# Patient Record
Sex: FEMALE | Employment: OTHER | URBAN - METROPOLITAN AREA
[De-identification: names, ages, dates, MRNs, and addresses within clinical notes are randomized per-mention and may not be internally consistent; named-entity substitution may affect disease eponyms.]

---

## 2019-06-01 ENCOUNTER — THERAPY VISIT (OUTPATIENT)
Dept: PHYSICAL THERAPY | Facility: CLINIC | Age: 64
End: 2019-06-01
Payer: MEDICAID

## 2019-06-01 DIAGNOSIS — G89.29 CHRONIC PAIN OF RIGHT KNEE: Primary | ICD-10-CM

## 2019-06-01 DIAGNOSIS — M25.561 CHRONIC PAIN OF RIGHT KNEE: Primary | ICD-10-CM

## 2019-06-01 PROCEDURE — 97161 PT EVAL LOW COMPLEX 20 MIN: CPT | Mod: GP | Performed by: PHYSICAL THERAPIST

## 2019-06-01 PROCEDURE — 97110 THERAPEUTIC EXERCISES: CPT | Mod: GP | Performed by: PHYSICAL THERAPIST

## 2019-06-01 NOTE — PROGRESS NOTES
[facility] Initial Evaluation  Subjective:  The history is provided by the patient (patient's son). No  was used (Pt and son declined ).   Iris Madsen is a 63 year old female with a right knee condition.  Condition occurred with:  Insidious onset.  Condition occurred: for unknown reasons.  This is a chronic condition  6-1-2014 (5 years ago)  Last MD appt 5-29-19  .    Patient reports pain:  In the joint, anterior and medial.    Pain is described as sharp and aching and is constant and reported as 9/10.  Associated symptoms:  Buckling/giving out.   Symptoms are exacerbated by activity, bending/squatting, walking, standing, descending stairs, ascending stairs and transfers and relieved by nothing.  Since onset symptoms are gradually worsening.        General health as reported by patient is good.  Pertinent medical history includes:  None.  Medical allergies: no.  Other surgeries include:  None reported.  Current medications:  None as reported by the patient.  Current occupation is Not working  .        Barriers include:  None as reported by the patient.    Red flags:  None as reported by the patient.                        Objective:  Standing Alignment:              Knee deviations alignment: Marked genu valgus R.      Gait:  Generally guarded gait pattern with minimal motion at R hip, knee noted.    Gait Type:  Antalgic   Assistive Devices:  Cane  Deviations:  Hip:  Decr dynamic control R and hip hiking RKnee:  Knee flexion decr R    Flexibility/Screens:       Lower Extremity:      Decreased right lower extremity flexibility:  Hamstrings and Gastroc                                                      Knee Evaluation:  ROM:  Strength wnl knee: R hip ext 4/5, hip abd 4-/5.  AROM    Hyperextension:  Left:  7    Right: 0  Extension:  Left: 0    Right:  15  Flexion: Left: 135    Right: 110  PROM    Hyperextension: Left:   Right:  0  Extension: Left:   Right:   12  Flexion: Left:   Right:  115      Strength:     Extension:  Right: 4+/5   Pain:  Flexion:  Right: 4+/5   Pain:    Quad Set Left: Fair    Pain:   Quad Set Right: Poor    Pain:      Palpation:      Right knee tenderness present at:  Medial Joint Line; Lateral Joint Line; Patellar Medial; Patellar Lateral; Patellar Superior and Patellar Inferior            General     ROS    Assessment/Plan:    Patient is a 63 year old female with right side knee complaints.    Patient has the following significant findings with corresponding treatment plan.                Diagnosis 1:  R knee pain (OA)  Pain -  self management, education and home program  Decreased ROM/flexibility - manual therapy, therapeutic exercise and home program  Decreased strength - therapeutic exercise, therapeutic activities and home program  Decreased proprioception - neuro re-education, therapeutic activities and home program  Impaired gait - gait training and home program  Impaired muscle performance - neuro re-education and home program  Decreased function - therapeutic activities and home program    Therapy Evaluation Codes:   1) History comprised of:   Personal factors that impact the plan of care:      Language and Time since onset of symptoms.    Comorbidity factors that impact the plan of care are:      None.     Medications impacting care: None.  2) Examination of Body Systems comprised of:   Body structures and functions that impact the plan of care:      Knee.   Activity limitations that impact the plan of care are:      Bathing, Dressing, Stairs and Walking.  3) Clinical presentation characteristics are:   Stable/Uncomplicated.  4) Decision-Making    Low complexity using standardized patient assessment instrument and/or measureable assessment of functional outcome.  Cumulative Therapy Evaluation is: Low complexity.    Previous and current functional limitations:  (See Goal Flow Sheet for this information)    Short term and Long term goals:  (See Goal Flow Sheet for this information)     Communication ability:  Patient appears to be able to clearly communicate and understand verbal and written communication and follow directions correctly.  Treatment Explanation - The following has been discussed with the patient:   RX ordered/plan of care  Anticipated outcomes  Possible risks and side effects  This patient would benefit from PT intervention to resume normal activities.   Rehab potential is fair.    Frequency:  1 X week, once daily  Duration:  for 4 weeks tapering to 2 X a month over 8 weeks  Discharge Plan:  Achieve all LTG.  Independent in home treatment program.  Reach maximal therapeutic benefit.    Please refer to the daily flowsheet for treatment today, total treatment time and time spent performing 1:1 timed codes.

## 2019-06-01 NOTE — LETTER
[facility]  [address]  [address]  [place] [address]  [phone]    Maral 3, 2019    Re: Iris Madsen   :   1955  MRN:  [id number]   REFERRING PHYSICIAN:   Murali Tinajero    [facility]    Date of Initial Evaluation:  19  Visits:  Rxs Used: 1  Reason for Referral:  Chronic pain of right knee    EVALUATION SUMMARY    [facility] Initial Evaluation  Subjective:  The history is provided by the patient (patient's son). No  was used (Pt and son declined ).   Iris Madsen is a 63 year old female with a right knee condition.  Condition occurred with:  Insidious onset.  Condition occurred: for unknown reasons.  This is a chronic condition  2014 (5 years ago) Last MD appt 19 Patient reports pain:  In the joint, anterior and medial.    Pain is described as sharp and aching and is constant and reported as 9/10.  Associated symptoms:  Buckling/giving out.   Symptoms are exacerbated by activity, bending/squatting, walking, standing, descending stairs, ascending stairs and transfers and relieved by nothing.  Since onset symptoms are gradually worsening.        General health as reported by patient is good.  Pertinent medical history includes:  None.  Medical allergies: no.  Other surgeries include:  None reported.  Current medications:  None as reported by the patient.  Current occupation is Not working      Barriers include:  None as reported by the patient.  Red flags:  None as reported by the patient.  Objective:  Standing Alignment:    Knee deviations alignment: Marked genu valgus R.  Gait:  Generally guarded gait pattern with minimal motion at R hip, knee noted.  Gait Type:  Antalgic   Assistive Devices:  Cane  Deviations:  Hip:  Decr dynamic control R and hip hiking RKnee:  Knee flexion decr R  Flexibility/Screens:   Lower Extremity:  Decreased right lower extremity flexibility:   Hamstrings and Gastroc       Knee Evaluation:  ROM:  Strength wnl knee: R hip ext 4/5, hip abd 4-/5.  AROM    Hyperextension:  Left:  7    Right: 0  Extension:  Left: 0    Right:  15    [place]   :   1955    Flexion: Left: 135    Right: 110  PROM    Hyperextension: Left:   Right:  0  Extension: Left:   Right:  12  Flexion: Left:   Right:  115      Strength:   Extension:  Right: 4+/5   Pain:  Flexion:  Right: 4+/5   Pain:    Quad Set Left: Fair    Pain:   Quad Set Right: Poor    Pain:      Palpation:    Right knee tenderness present at:  Medial Joint Line; Lateral Joint Line; Patellar Medial; Patellar Lateral; Patellar Superior and Patellar Inferior      Assessment/Plan:    Patient is a 63 year old female with right side knee complaints.    Patient has the following significant findings with corresponding treatment plan.                Diagnosis 1:  R knee pain (OA)  Pain -  self management, education and home program  Decreased ROM/flexibility - manual therapy, therapeutic exercise and home program  Decreased strength - therapeutic exercise, therapeutic activities and home program  Decreased proprioception - neuro re-education, therapeutic activities and home program  Impaired gait - gait training and home program  Impaired muscle performance - neuro re-education and home program  Decreased function - therapeutic activities and home program      Previous and current functional limitations:  (See Goal Flow Sheet for this information)    Short term and Long term goals: (See Goal Flow Sheet for this information)     Communication ability:  Patient appears to be able to clearly communicate and understand verbal and written communication and follow directions correctly.  Treatment Explanation - The following has been discussed with the patient:   RX ordered/plan of care  Anticipated outcomes  Possible risks and side effects  This patient would benefit from PT intervention to resume normal activities.   Rehab  potential is fair.    Frequency:  1 X week, once daily  Duration:  for 4 weeks tapering to 2 X a month over 8 weeks  Discharge Plan:  Achieve all LTG.  Independent in home treatment program.  Reach maximal therapeutic benefit.    Iris Cale   :   1955            Thank you for your referral.    INQUIRIES  Therapist: Amrik Major PT  [facility]  [address]  [place] [address]  Phone: [phone]  Fax: [phone]

## 2019-06-01 NOTE — LETTER
DEPARTMENT OF HEALTH AND HUMAN SERVICES  CENTERS FOR MEDICARE & MEDICAID SERVICES    PLAN/UPDATED PLAN OF PROGRESS FOR OUTPATIENT REHABILITATION    PATIENTS NAME:  Iris Madsen   : 1955  PROVIDER NUMBER:    [id number]  HICN: [id number]  PROVIDER NAME: [facility]  MEDICAL RECORD NUMBER: [id number]   START OF CARE DATE:   2019   TYPE:  PT  PRIMARY/TREATMENT DIAGNOSIS: (Pertinent Medical Diagnosis)  Chronic pain of right knee  VISITS FROM START OF CARE:  Rxs Used: 1     [facility] Initial Evaluation  Subjective:  The history is provided by the patient (patient's son). No  was used (Pt and son declined ). Iris Madsen is a 63 year old female with a right knee condition.  Condition occurred with:  Insidious onset.  Condition occurred: for unknown reasons.  This is a chronic condition  2014 (5 years ago)  Last MD appt 19  Patient reports pain:  In the joint, anterior and medial.    Pain is described as sharp and aching and is constant and reported as 9/10.  Associated symptoms:  Buckling/giving out.  Symptoms are exacerbated by activity, bending/squatting, walking, standing, descending stairs, ascending stairs and transfers and relieved by nothing.  Since onset symptoms are gradually worsening.   General health as reported by patient is good.  Pertinent medical history includes:  None.  Medical allergies: no.  Other surgeries include:  None reported.  Current medications:  None as reported by the patient.  Current occupation is Not working  Barriers include:  None as reported by the patient.  Red flags:  None as reported by the patient.                Objective:  Standing Alignment:    Knee deviations alignment: Marked genu valgus R.  Gait:  Generally guarded gait pattern with minimal motion at R hip, knee noted.  Gait Type:  Antalgic   Assistive Devices:  Cane  Deviations:  Hip:  Decr dynamic control R and hip hiking RKnee:   Knee flexion decr R  Flexibility/Screens:   Lower Extremity:  Decreased right lower extremity flexibility:  Hamstrings and Gastroc     Knee Evaluation:  ROM:  Strength wnl knee: R hip ext 4/5, hip abd 4-/5.  AROM  Hyperextension:  Left:  7    Right: 0  Extension:  Left: 0    Right:  15  Flexion: Left: 135    Right: 110  PROM  Hyperextension: Left:   Right:  0  Extension: Left:   Right:  12  Flexion: Left:   Right:  115  PATIENTS NAME:  Iris Madsen   : 1955    Strength:   Extension:  Right: 4+/5   Pain:  Flexion:  Right: 4+/5   Pain:    Quad Set Left: Fair    Pain:   Quad Set Right: Poor    Pain:  Palpation:    Right knee tenderness present at:  Medial Joint Line; Lateral Joint Line; Patellar Medial; Patellar Lateral; Patellar Superior and Patellar Inferior    Assessment/Plan:    Patient is a 63 year old female with right side knee complaints.    Patient has the following significant findings with corresponding treatment plan.                Diagnosis 1:  R knee pain (OA)  Pain -  self management, education and home program  Decreased ROM/flexibility - manual therapy, therapeutic exercise and home program  Decreased strength - therapeutic exercise, therapeutic activities and home program  Decreased proprioception - neuro re-education, therapeutic activities and home program  Impaired gait - gait training and home program  Impaired muscle performance - neuro re-education and home program  Decreased function - therapeutic activities and home program    Therapy Evaluation Codes:   1) History comprised of:   Personal factors that impact the plan of care:      Language and Time since onset of symptoms.    Comorbidity factors that impact the plan of care are:      None.     Medications impacting care: None.  2) Examination of Body Systems comprised of:   Body structures and functions that impact the plan of care:      Knee.   Activity limitations that impact the plan of care are:      Bathing, Dressing, Stairs and  "Walking.  3) Clinical presentation characteristics are:   Stable/Uncomplicated.  4) Decision-Making    Low complexity using standardized patient assessment instrument and/or measureable assessment of functional outcome.  Cumulative Therapy Evaluation is: Low complexity.  Previous and current functional limitations:  (See Goal Flow Sheet for this information)    Short term and Long term goals: (See Goal Flow Sheet for this information)   Communication ability:  Patient appears to be able to clearly communicate and understand verbal and written communication and follow directions correctly.  Treatment Explanation - The following has been discussed with the patient:   RX ordered/plan of care  Anticipated outcomes  Possible risks and side effects  This patient would benefit from PT intervention to resume normal activities.   Rehab potential is fair.  Frequency:  1 X week, once daily  Duration:  for 4 weeks tapering to 2 X a month over 8 weeks  PATIENTS NAME:  Iris Madsen   : 1955    Discharge Plan:  Achieve all LTG.  Independent in home treatment program.  Reach maximal therapeutic benefit.  Please refer to the daily flowsheet for treatment today, total treatment time and time spent performing 1:1 timed codes.         Caregiver Signature/Credentials _____________________________ Date ________       Treating Provider: Amrik Major PT   I have reviewed and certified the need for these services and plan of treatment while under my care.        PHYSICIAN'S SIGNATURE:   _________________________________________  Date___________   Murali Tinajero PA-C  Certification period: 2019   to        Functional Level Progress Report: Please see attached \"Goal Flow sheet for Functional level.\"    ____X____ Continue Services or       ________ DC Services                Service dates: From 2019 to present                         "

## 2019-06-08 ENCOUNTER — THERAPY VISIT (OUTPATIENT)
Dept: PHYSICAL THERAPY | Facility: CLINIC | Age: 64
End: 2019-06-08
Payer: MEDICAID

## 2019-06-08 DIAGNOSIS — G89.29 CHRONIC PAIN OF RIGHT KNEE: ICD-10-CM

## 2019-06-08 DIAGNOSIS — M25.561 CHRONIC PAIN OF RIGHT KNEE: ICD-10-CM

## 2019-06-08 PROCEDURE — 97110 THERAPEUTIC EXERCISES: CPT | Mod: GP | Performed by: PHYSICAL THERAPIST

## 2019-06-08 PROCEDURE — 97112 NEUROMUSCULAR REEDUCATION: CPT | Mod: GP | Performed by: PHYSICAL THERAPIST

## 2019-06-19 ENCOUNTER — THERAPY VISIT (OUTPATIENT)
Dept: PHYSICAL THERAPY | Facility: CLINIC | Age: 64
End: 2019-06-19
Payer: MEDICAID

## 2019-06-19 DIAGNOSIS — G89.29 CHRONIC PAIN OF RIGHT KNEE: ICD-10-CM

## 2019-06-19 DIAGNOSIS — M25.561 CHRONIC PAIN OF RIGHT KNEE: ICD-10-CM

## 2019-06-19 PROCEDURE — T1013 SIGN LANG/ORAL INTERPRETER: HCPCS | Mod: U3 | Performed by: PHYSICAL THERAPY ASSISTANT

## 2019-06-19 PROCEDURE — 97110 THERAPEUTIC EXERCISES: CPT | Mod: GP | Performed by: PHYSICAL THERAPY ASSISTANT

## 2019-06-19 NOTE — LETTER
[place] [facility]  [address]  [address]  [place] [address]  [phone]    2019    Re: Iris Madsen   :   1955  MRN:  [id number]   REFERRING PHYSICIAN:   Murali Tinajero    [facility]    Date of Initial Evaluation:  19  Visits:  Rxs Used: 3 (PTA 1)  Reason for Referral:  Chronic pain of right knee    EVALUATION SUMMARY    PROGRESS  REPORT    Progress reporting period is from 2019 to 2019.       SUBJECTIVE  Subjective changes noted by patient: No major changes for the R knee. Son and pt are wondering could a partial KR be the next step versus the TKR.     Current pain level is  No change.     Previous pain level was 9/10.   Changes in function:  None  Adverse reaction to treatment or activity: None    OBJECTIVE  Changes noted in objective findings:  Supine: 9at rest) noted 30 degrees of valgus on the right knee/leg.   AROM: right knee AROM: 0- degrees. Left knee 0-0-140 degrees.      ASSESSMENT/PLAN  Updated problem list and treatment plan: Diagnosis 1:  Right knee pain, OA.   Pain -  manual therapy, splint/taping/bracing/orthotics, self management and home program  Decreased ROM/flexibility - manual therapy, therapeutic exercise and home program  Decreased strength - therapeutic exercise, therapeutic activities and home program  Impaired gait - gait training, assistive devices and home program  Decreased function - therapeutic activities and home program  STG/LTGs have been met or progress has been made towards goals:  None  Assessment of Progress: The patient's condition is unchanged.  Self Management Plans:  Patient has been instructed in a home treatment program.  Patient  has been instructed in self management of symptoms.  I have re-evaluated this patient and find that the nature, scope, duration and intensity of the therapy is appropriate for the medical condition of the patient.  Iris continues to  require the following intervention to meet STG and LTG's:  PT     Iris Madsen   :   1955        Recommendations:  This patient would benefit from further evaluation.  The progress note summary was written in collaboration with and reviewed by the physical therapist.              Thank you for your referral.    INQUIRIES  Therapist: Amrik Major, CRISTIN   [facility]  [address]  [place] [address]  Phone: [phone]  Fax: [phone]

## 2019-06-19 NOTE — PROGRESS NOTES
PROGRESS  REPORT    Progress reporting period is from 6/1/2019 to 6/19/2019.       SUBJECTIVE  Subjective changes noted by patient: No major changes for the R knee. Son and pt are wondering could a partial KR be the next step versus the total KR.     Current pain level is  No change.     Previous pain level was 9/10.   Changes in function:  None  Adverse reaction to treatment or activity: None    OBJECTIVE  Changes noted in objective findings:  Supine: (at rest) noted 30 degrees of valgus on the right knee/leg.   AROM: right knee AROM: 0- degrees. Left knee 0-0-140 degrees.      ASSESSMENT/PLAN  Updated problem list and treatment plan: Diagnosis 1:  Right knee pain, OA.   Pain -  manual therapy, splint/taping/bracing/orthotics, self management and home program  Decreased ROM/flexibility - manual therapy, therapeutic exercise and home program  Decreased strength - therapeutic exercise, therapeutic activities and home program  Impaired gait - gait training, assistive devices and home program  Decreased function - therapeutic activities and home program  STG/LTGs have been met or progress has been made towards goals:  None  Assessment of Progress: The patient's condition is unchanged.  Self Management Plans:  Patient has been instructed in a home treatment program.  Patient  has been instructed in self management of symptoms.  I have re-evaluated this patient and find that the nature, scope, duration and intensity of the therapy is appropriate for the medical condition of the patient.  [place] continues to require the following intervention to meet STG and LTG's:  PT    Recommendations:  This patient would benefit from further evaluation.  The progress note summary was written in collaboration with and reviewed by the physical therapist.    Please refer to the daily flowsheet for treatment today, total treatment time and time spent performing 1:1 timed codes.

## 2019-08-19 PROBLEM — M25.561 CHRONIC PAIN OF RIGHT KNEE: Status: RESOLVED | Noted: 2019-06-01 | Resolved: 2019-08-19

## 2019-08-19 PROBLEM — G89.29 CHRONIC PAIN OF RIGHT KNEE: Status: RESOLVED | Noted: 2019-06-01 | Resolved: 2019-08-19

## 2019-08-19 NOTE — PROGRESS NOTES
Pt last seen in PT 06/19.  See recommendations that date.  No further PT has been scheduled since then.  Consider note dated 06/19 to serve as final summary.

## 2019-10-03 ENCOUNTER — THERAPY VISIT (OUTPATIENT)
Dept: PHYSICAL THERAPY | Facility: CLINIC | Age: 64
End: 2019-10-03
Payer: COMMERCIAL

## 2019-10-03 DIAGNOSIS — M25.561 ACUTE PAIN OF RIGHT KNEE: Primary | ICD-10-CM

## 2019-10-03 DIAGNOSIS — Z47.1 AFTERCARE FOLLOWING KNEE JOINT REPLACEMENT SURGERY, UNSPECIFIED LATERALITY: ICD-10-CM

## 2019-10-03 DIAGNOSIS — Z96.659 AFTERCARE FOLLOWING KNEE JOINT REPLACEMENT SURGERY, UNSPECIFIED LATERALITY: ICD-10-CM

## 2019-10-03 PROCEDURE — 97110 THERAPEUTIC EXERCISES: CPT | Mod: GP | Performed by: PHYSICAL THERAPIST

## 2019-10-03 PROCEDURE — 97530 THERAPEUTIC ACTIVITIES: CPT | Mod: GP | Performed by: PHYSICAL THERAPIST

## 2019-10-03 PROCEDURE — 97161 PT EVAL LOW COMPLEX 20 MIN: CPT | Mod: GP | Performed by: PHYSICAL THERAPIST

## 2019-10-03 NOTE — LETTER
[facility] ATHLETIC [facility] PHYSICAL THERAPY  [address]  [facility] [address]  [phone]    2019    Re: Iris Madsen   :   1955  MRN:  [id number]   REFERRING PHYSICIAN:   Cassandra Aldridge    [facility] ATHLETIC [facility] PHYSICAL [facility]  Date of Initial Evaluation:  10/3/2019  Visits:    1  Reason for Referral:     Acute pain of right knee  Aftercare following knee joint replacement surgery, unspecified laterality    [facility] Athletic [facility] Initial Evaluation -- Lower Extremity  Evaluation Date: October 3, 2019  Iris Madsen is a 64 year old female with a Rt knee condition.   Referral: Ortho  Work mechanical stresses: NA   Employment status: Retired  Leisure mechanical stresses:   Functional disability score: Pt did not complete  VAS score (0-10): 5-6/10  Patient goals/expectations:  Walk without cane.    HISTORY:  Present symptoms: Global Rt knee pain  Pain quality (sharp/shooting/stabbing/aching/burning/cramping):  achy  Present since (onset date): 5 yr hx of advanced DJD.  TKA 19.    Symptoms (improving/unchaning/worsening):  Improving.    Symptoms commenced as a result of: No apparent reason   Condition occurred in the following environment: Unknown   Symptoms at onset: Rt knee    Paresthesia (yes/no):  No  Spinal history: No     Cough/Sneeze (pos/neg):  Neg  Constant symptoms:   Intermittent symptoms: As above  Symptoms are worse with the following: Always Bending, Always Rising, Always First few steps, Always Standing, Always Walking, Always Stairs, Always Squatting and Time of day - No effect   Symptoms are better with the following: Other - ice, pain meds  Continued use makes the pain (better/worse/no effect): NA  Disturbed night (yes/no): Yes    Pain at rest (yes/no):  Yes    ite (back/hip/knee/ankle/foot):  Rt knee  Other questions (swelling/clicking/locking/giving way/falling):  swelling   Previous episodes: No  Previous  treatments: PT pre surgery  Re: Iris Madsen   :   1955    Specific Questions:  General health (excellent/good/fair/poor):  Good  Pertinent medical history includes: None  Medications (nil/NSAIDS/analg/steroids/anticoag/other):  None  Medical allergies:  No  Imaging (none/Xray/MRI/other):  X-ray  Recent or major surgery (yes/no):  No  Night pain (yes/no):  No  Accidents (yes/no):  No  Unexplained weight loss (yes/no):  No  Barriers at home: Lives in apartment, 12-14 stairs to get to apartment with railing  Other red flags: No    Sites for physical examination (back/hip/knee/ankle/foot/other): Knee    EXAMINATION    Posture:  Sitting (good/fair/poor): NT    Correction of Posture (better/worse/no effect/NA): NT  Standing (good/fair/poor): NT  Other observations:  Gait - Lacks TKE and knee flex  Neurological: (NA/motor/sensory/reflexes/dural): NT  Baselines (pain or functional activity): Decreased overall functional mobility due to post surgical status  Extremities (Hip / Knee / Ankle / Foot): Knee  Movement Loss Ehsan Mod Min Nil Pain   Flexion  X   105 deg/heel slide   Extension X    15 deg lacking   Passive Movement (+/- over pressure)/(PDM/ERP):  NT  Resisted Test Response (pain): Quad set fair; SLR with min-mod lag  Other Tests: NT    Spine:  Movement loss: NT  Effect of repeated movements: NT  Effect of static positioning: NT  Spine testing (not relevant/relevant/secondary problem): Not relevant                        Re: Iris Madsen   :   1955    Baseline Symptoms: NA  Repeated Tests Symptom Response Mechanical Response   Active/Passive movement, resisted test, functional test During -  Produce, Abolish, Increase, Decrease, NE After -  Better, Worse, NB, NW, NE Effect -   ? or ? ROM, strength or key functional test No   Effect   NT       Effect of static positioning       NT         Provisional Classification (Extremity/Spine):  Extremity - Other - Post-Surgery      Princicple of Management:    Education:  HEP ed with continuing to perform home exercises by home PT 2 x daily.  Gait training with SPC    Equipment provided:  None  Exercise and dosage:  See flowsheet    ASSESSMENT/PLAN:  Patient is a 64 year old female with right side knee complaints.    Patient has the following significant findings with corresponding treatment plan.                Diagnosis 1:  Rt TKA  Pain -  self management, education, directional preference exercise and home program  Decreased ROM/flexibility - manual therapy, therapeutic exercise and home program  Decreased joint mobility - manual therapy, therapeutic exercise and home program  Decreased strength - therapeutic exercise, therapeutic activities and home program  Impaired balance - neuro re-education, therapeutic activities and home program  Decreased proprioception - neuro re-education, therapeutic activities and home program  Inflammation - cold therapy and self management/home program  Edema - cold therapy and self management/home program  Impaired gait - gait training and home program  Impaired muscle performance - neuro re-education and home program  Decreased function - therapeutic activities and home program  Previous and current functional limitations:  (See Goal Flow Sheet for this information)    Short term and Long term goals: (See Goal Flow Sheet for this information)   Communication ability:  Patient has an  for communication clarity.  Treatment Explanation - The following has been discussed with the patient:   RX ordered/plan of care, Anticipated outcomes, Possible risks and side effects              Re: Iris Cale   :   1955    This patient would benefit from PT intervention to resume normal activities.   Rehab potential is good.  Frequency:  2 X week, once daily  Duration:  for 4 weeks tapering to 1 x week x 4 weeks  Discharge Plan:  Achieve all LTG.  Independent in home treatment program.  Reach maximal therapeutic benefit.    Thank you  for your referral.    INQUIRIES        Therapist:  Anthony Ceballos, SCARLET, Cert. MDT  [facility]  [place] [facility]  [address] [facility] [address]  [facility] [address]  Phone: [phone]  Fax: [phone]

## 2019-10-03 NOTE — PROGRESS NOTES
[facility] Initial Evaluation -- Lower Extremity    Evaluation Date: October 3, 2019  Iris Madsen is a 64 year old female with a Rt knee condition.   Referral: Ortho  Work mechanical stresses: NA   Employment status: Retired  Leisure mechanical stresses:   Functional disability score: Pt did not complete  VAS score (0-10): 5-6/10  Patient goals/expectations:  Walk without cane.    HISTORY:    Present symptoms: Global Rt knee pain  Pain quality (sharp/shooting/stabbing/aching/burning/cramping):  achy    Present since (onset date): 5 yr hx of advanced DJD.  TKA 9/9/19.    Symptoms (improving/unchaning/worsening):  Improving.      Symptoms commenced as a result of: No apparent reason   Condition occurred in the following environment: Unknown     Symptoms at onset: Rt knee  Paresthesia (yes/no):  No  Spinal history: No   Cough/Sneeze (pos/neg):  Neg    Constant symptoms:   Intermittent symptoms: As above    Symptoms are worse with the following: Always Bending, Always Rising, Always First few steps, Always Standing, Always Walking, Always Stairs, Always Squatting and Time of day - No effect   Symptoms are better with the following: Other - ice, pain meds    Continued use makes the pain (better/worse/no effect): NA    Disturbed night (yes/no): Yes      Pain at rest (yes/no):  Yes  Site (back/hip/knee/ankle/foot):  Rt knee    Other questions (swelling/clicking/locking/giving way/falling):  swelling     Previous episodes: No  Previous treatments: PT pre surgery    Specific Questions:  General health (excellent/good/fair/poor):  Good  Pertinent medical history includes: None  Medications (nil/NSAIDS/analg/steroids/anticoag/other):  None  Medical allergies:  No  Imaging (none/Xray/MRI/other):  X-ray  Recent or major surgery (yes/no):  No  Night pain (yes/no):  No  Accidents (yes/no):  No  Unexplained weight loss (yes/no):  No  Barriers at home: Lives in apartment, 12-14 stairs to get to apartment with  railing  Other red flags: No    Sites for physical examination (back/hip/knee/ankle/foot/other): Knee    EXAMINATION    Posture:  Sitting (good/fair/poor): NT    Correction of Posture (better/worse/no effect/NA): NT  Standing (good/fair/poor): NT  Other observations:  Gait - Lacks TKE and knee flex    Neurological: (NA/motor/sensory/reflexes/dural): NT    Baselines (pain or functional activity): Decreased overall functional mobility due to post surgical status    Extremities (Hip / Knee / Ankle / Foot): Knee    Movement Loss Ehsan Mod Min Nil Pain   Flexion  X   105 deg/heel slide   Extension X    15 deg lacking     Passive Movement (+/- over pressure)/(PDM/ERP):  NT  Resisted Test Response (pain): Quad set fair; SLR with min-mod lag  Other Tests: NT    Spine:  Movement loss: NT  Effect of repeated movements: NT  Effect of static positioning: NT  Spine testing (not relevant/relevant/secondary problem): Not relevant    Baseline Symptoms: NA  Repeated Tests Symptom Response Mechanical Response   Active/Passive movement, resisted test, functional test During -  Produce, Abolish, Increase, Decrease, NE After -  Better, Worse, NB, NW, NE Effect -   ? or ? ROM, strength or key functional test No   Effect   NT       Effect of static positioning       NT         Provisional Classification (Extremity/Spine):  Extremity - Other - Post-Surgery      Princicple of Management:   Education:  HEP ed with continuing to perform home exercises by home PT 2 x daily.  Gait training with SPC    Equipment provided:  None  Exercise and dosage:  See flowsheet    ASSESSMENT/PLAN:    Patient is a 64 year old female with right side knee complaints.    Patient has the following significant findings with corresponding treatment plan.                Diagnosis 1:  Rt TKA    Pain -  self management, education, directional preference exercise and home program  Decreased ROM/flexibility - manual therapy, therapeutic exercise and home program  Decreased  joint mobility - manual therapy, therapeutic exercise and home program  Decreased strength - therapeutic exercise, therapeutic activities and home program  Impaired balance - neuro re-education, therapeutic activities and home program  Decreased proprioception - neuro re-education, therapeutic activities and home program  Inflammation - cold therapy and self management/home program  Edema - cold therapy and self management/home program  Impaired gait - gait training and home program  Impaired muscle performance - neuro re-education and home program  Decreased function - therapeutic activities and home program    Previous and current functional limitations:  (See Goal Flow Sheet for this information)    Short term and Long term goals: (See Goal Flow Sheet for this information)     Communication ability:  Patient has an  for communication clarity.  Treatment Explanation - The following has been discussed with the patient:   RX ordered/plan of care  Anticipated outcomes  Possible risks and side effects  This patient would benefit from PT intervention to resume normal activities.   Rehab potential is good.    Frequency:  2 X week, once daily  Duration:  for 4 weeks tapering to 1 x week x 4 weeks  Discharge Plan:  Achieve all LTG.  Independent in home treatment program.  Reach maximal therapeutic benefit.    Please refer to the daily flowsheet for treatment today, total treatment time and time spent performing 1:1 timed codes.

## 2019-10-08 ENCOUNTER — THERAPY VISIT (OUTPATIENT)
Dept: PHYSICAL THERAPY | Facility: CLINIC | Age: 64
End: 2019-10-08
Payer: COMMERCIAL

## 2019-10-08 DIAGNOSIS — Z47.1 AFTERCARE FOLLOWING KNEE JOINT REPLACEMENT SURGERY, UNSPECIFIED LATERALITY: ICD-10-CM

## 2019-10-08 DIAGNOSIS — Z96.659 AFTERCARE FOLLOWING KNEE JOINT REPLACEMENT SURGERY, UNSPECIFIED LATERALITY: ICD-10-CM

## 2019-10-08 DIAGNOSIS — M25.561 ACUTE PAIN OF RIGHT KNEE: Primary | ICD-10-CM

## 2019-10-08 PROCEDURE — 97110 THERAPEUTIC EXERCISES: CPT | Mod: GP | Performed by: PHYSICAL THERAPIST

## 2019-10-08 PROCEDURE — 97140 MANUAL THERAPY 1/> REGIONS: CPT | Mod: GP | Performed by: PHYSICAL THERAPIST

## 2019-10-10 ENCOUNTER — THERAPY VISIT (OUTPATIENT)
Dept: PHYSICAL THERAPY | Facility: CLINIC | Age: 64
End: 2019-10-10
Payer: COMMERCIAL

## 2019-10-10 DIAGNOSIS — M25.561 ACUTE PAIN OF RIGHT KNEE: Primary | ICD-10-CM

## 2019-10-10 DIAGNOSIS — Z47.1 AFTERCARE FOLLOWING KNEE JOINT REPLACEMENT SURGERY, UNSPECIFIED LATERALITY: ICD-10-CM

## 2019-10-10 DIAGNOSIS — Z96.659 AFTERCARE FOLLOWING KNEE JOINT REPLACEMENT SURGERY, UNSPECIFIED LATERALITY: ICD-10-CM

## 2019-10-10 PROCEDURE — 97110 THERAPEUTIC EXERCISES: CPT | Mod: GP | Performed by: PHYSICAL THERAPIST

## 2019-10-10 PROCEDURE — 97530 THERAPEUTIC ACTIVITIES: CPT | Mod: GP | Performed by: PHYSICAL THERAPIST

## 2019-10-14 ENCOUNTER — THERAPY VISIT (OUTPATIENT)
Dept: PHYSICAL THERAPY | Facility: CLINIC | Age: 64
End: 2019-10-14
Payer: COMMERCIAL

## 2019-10-14 DIAGNOSIS — M25.561 ACUTE PAIN OF RIGHT KNEE: ICD-10-CM

## 2019-10-14 DIAGNOSIS — Z96.659 AFTERCARE FOLLOWING KNEE JOINT REPLACEMENT SURGERY, UNSPECIFIED LATERALITY: ICD-10-CM

## 2019-10-14 DIAGNOSIS — Z47.1 AFTERCARE FOLLOWING KNEE JOINT REPLACEMENT SURGERY, UNSPECIFIED LATERALITY: ICD-10-CM

## 2019-10-14 PROCEDURE — 97530 THERAPEUTIC ACTIVITIES: CPT | Mod: GP | Performed by: PHYSICAL THERAPY ASSISTANT

## 2019-10-14 PROCEDURE — 97110 THERAPEUTIC EXERCISES: CPT | Mod: GP | Performed by: PHYSICAL THERAPY ASSISTANT

## 2019-10-16 ENCOUNTER — THERAPY VISIT (OUTPATIENT)
Dept: PHYSICAL THERAPY | Facility: CLINIC | Age: 64
End: 2019-10-16
Payer: COMMERCIAL

## 2019-10-16 DIAGNOSIS — Z47.1 AFTERCARE FOLLOWING KNEE JOINT REPLACEMENT SURGERY, UNSPECIFIED LATERALITY: ICD-10-CM

## 2019-10-16 DIAGNOSIS — M25.561 ACUTE PAIN OF RIGHT KNEE: ICD-10-CM

## 2019-10-16 DIAGNOSIS — Z96.659 AFTERCARE FOLLOWING KNEE JOINT REPLACEMENT SURGERY, UNSPECIFIED LATERALITY: ICD-10-CM

## 2019-10-16 PROCEDURE — 97110 THERAPEUTIC EXERCISES: CPT | Mod: GP | Performed by: PHYSICAL THERAPY ASSISTANT

## 2019-10-16 PROCEDURE — 97530 THERAPEUTIC ACTIVITIES: CPT | Mod: GP | Performed by: PHYSICAL THERAPY ASSISTANT

## 2019-10-21 ENCOUNTER — THERAPY VISIT (OUTPATIENT)
Dept: PHYSICAL THERAPY | Facility: CLINIC | Age: 64
End: 2019-10-21
Payer: COMMERCIAL

## 2019-10-21 DIAGNOSIS — Z96.659 AFTERCARE FOLLOWING KNEE JOINT REPLACEMENT SURGERY, UNSPECIFIED LATERALITY: ICD-10-CM

## 2019-10-21 DIAGNOSIS — M25.561 ACUTE PAIN OF RIGHT KNEE: ICD-10-CM

## 2019-10-21 DIAGNOSIS — Z47.1 AFTERCARE FOLLOWING KNEE JOINT REPLACEMENT SURGERY, UNSPECIFIED LATERALITY: ICD-10-CM

## 2019-10-21 PROCEDURE — 97110 THERAPEUTIC EXERCISES: CPT | Mod: GP | Performed by: PHYSICAL THERAPY ASSISTANT

## 2019-10-21 PROCEDURE — 97530 THERAPEUTIC ACTIVITIES: CPT | Mod: GP | Performed by: PHYSICAL THERAPY ASSISTANT

## 2019-10-21 NOTE — LETTER
"[facility] ATHLETIC [facility] PHYSICAL THERAPY  [address]  [facility] [address]  [phone]    2019    Re: Iris Madsen   :   1955  MRN:  [id number]   REFERRING PHYSICIAN:   Cassandra Aldridge    [facility] ATHLETIC [facility] PHYSICAL THERAPY    Date of Initial Evaluation:  10/3/2019  Visits:   6  Reason for Referral:     Acute pain of right knee  Aftercare following knee joint replacement surgery, unspecified laterality    PROGRESS  REPORT:  Progress reporting period is from 10/3/2019 to 10/1/2019..       SUBJECTIVE:   Pt reports knee mainly stiff vs painful.  Has started walking around a mall being able to go 15 minutes before needing to rest d/t fatigue not soreness. Is able to go up stairs normally but not down yet as does not trust her leg. Is sleeping almost normally with mild aching sometimes awakening her.    Current Pain level: 3/10.   Initial Pain level: 5/10.   Changes in function:  Yes (See Goal flowsheet attached for changes in current functional level).  Adverse reaction to treatment or activity: None    OBJECTIVE  Changes noted in objective findings:  AAROM 0-2-118. Lacks TKE and push off with ambulation but corrects with cueing. Minor swelling present at superior/lateral knee and appropriate for post op status. Pt is doing well with HEP. Is able to do 4\" step up and over with minimal difficulty.      ASSESSMENT/PLAN  Updated problem list and treatment plan: Diagnosis 1:  R TKR   STG/LTGs have been met or progress has been made towards goals:  Yes (See Goal flow sheet completed today.)  Assessment of Progress: The patient's condition is improving.  Patient is meeting short term goals and is progressing towards long term goals.  Self Management Plans:  Patient has been instructed in a home treatment program.  Patient is independent in a home treatment program.  Patient  has been instructed in self management of symptoms.  I have " re-evaluated this patient and find that the nature, scope, duration and intensity of the therapy is appropriate for the medical condition of the patient.  Iris continues to require the following intervention to meet STG and LTG's:  PT              Re: Iris Madsen   :   1955    Recommendations:  This patient would benefit from continued therapy.     Frequency:  2 X week, once daily  Duration:  for 2 weeks tapering to 1 X a week over 3-4 weeks    Thank you for your referral.    INQUIRIES      Therapist:  Areli Lizarraga, [facility]  [facility] PHYSICAL THERAPY  [address] [facility] [address]  [facility] [address]  Phone: [phone]  Fax: [phone]

## 2019-10-23 NOTE — PROGRESS NOTES
"Subjective:  HPI                    Objective:  System    Physical Exam    General     ROS    Assessment/Plan:    PROGRESS  REPORT    Progress reporting period is from 10/3/2019 to 10/1/2019..       SUBJECTIVE:   Pt reports knee mainly stiff vs painful.  Has started walking around a mall being able to go 15 minutes before needing to rest d/t fatigue not soreness. Is able to go up stairs normally but not down yet as does not trust her leg. Is sleeping almost normally with mild aching sometimes awakening her.    Current Pain level: 3/10.      Initial Pain level: 5/10.   Changes in function:  Yes (See Goal flowsheet attached for changes in current functional level)  Adverse reaction to treatment or activity: None    OBJECTIVE  Changes noted in objective findings:  AAROM 0-2-118. Lacks TKE and push off with ambulation but corrects with cueing. Minor swelling present at superior/lateral knee and appropriate for post op status. Pt is doing well with HEP. Is able to do 4\" step up and over with minimal difficulty.        ASSESSMENT/PLAN  Updated problem list and treatment plan: Diagnosis 1:  R TKR   STG/LTGs have been met or progress has been made towards goals:  Yes (See Goal flow sheet completed today.)  Assessment of Progress: The patient's condition is improving.  Patient is meeting short term goals and is progressing towards long term goals.  Self Management Plans:  Patient has been instructed in a home treatment program.  Patient is independent in a home treatment program.  Patient  has been instructed in self management of symptoms.  I have re-evaluated this patient and find that the nature, scope, duration and intensity of the therapy is appropriate for the medical condition of the patient.  [place] continues to require the following intervention to meet STG and LTG's:  PT    Recommendations:  This patient would benefit from continued therapy.     Frequency:  2 X week, once daily  Duration:  for 2 weeks tapering to 1 X " a week over 3-4 weeks        Please refer to the daily flowsheet for treatment today, total treatment time and time spent performing 1:1 timed codes.

## 2019-10-24 ENCOUNTER — THERAPY VISIT (OUTPATIENT)
Dept: PHYSICAL THERAPY | Facility: CLINIC | Age: 64
End: 2019-10-24
Payer: COMMERCIAL

## 2019-10-24 DIAGNOSIS — Z96.659 AFTERCARE FOLLOWING KNEE JOINT REPLACEMENT SURGERY, UNSPECIFIED LATERALITY: ICD-10-CM

## 2019-10-24 DIAGNOSIS — Z47.1 AFTERCARE FOLLOWING KNEE JOINT REPLACEMENT SURGERY, UNSPECIFIED LATERALITY: ICD-10-CM

## 2019-10-24 DIAGNOSIS — M25.561 ACUTE PAIN OF RIGHT KNEE: Primary | ICD-10-CM

## 2019-10-24 PROCEDURE — 97530 THERAPEUTIC ACTIVITIES: CPT | Mod: GP | Performed by: PHYSICAL THERAPIST

## 2019-10-24 PROCEDURE — 97110 THERAPEUTIC EXERCISES: CPT | Mod: GP | Performed by: PHYSICAL THERAPIST

## 2019-10-28 ENCOUNTER — THERAPY VISIT (OUTPATIENT)
Dept: PHYSICAL THERAPY | Facility: CLINIC | Age: 64
End: 2019-10-28
Payer: COMMERCIAL

## 2019-10-28 DIAGNOSIS — M25.561 ACUTE PAIN OF RIGHT KNEE: ICD-10-CM

## 2019-10-28 DIAGNOSIS — Z96.659 AFTERCARE FOLLOWING KNEE JOINT REPLACEMENT SURGERY, UNSPECIFIED LATERALITY: ICD-10-CM

## 2019-10-28 DIAGNOSIS — Z47.1 AFTERCARE FOLLOWING KNEE JOINT REPLACEMENT SURGERY, UNSPECIFIED LATERALITY: ICD-10-CM

## 2019-10-28 PROCEDURE — 97110 THERAPEUTIC EXERCISES: CPT | Mod: GP | Performed by: PHYSICAL THERAPY ASSISTANT

## 2019-10-28 PROCEDURE — 97530 THERAPEUTIC ACTIVITIES: CPT | Mod: GP | Performed by: PHYSICAL THERAPY ASSISTANT

## 2019-10-31 ENCOUNTER — THERAPY VISIT (OUTPATIENT)
Dept: PHYSICAL THERAPY | Facility: CLINIC | Age: 64
End: 2019-10-31
Payer: COMMERCIAL

## 2019-10-31 DIAGNOSIS — Z96.659 AFTERCARE FOLLOWING KNEE JOINT REPLACEMENT SURGERY, UNSPECIFIED LATERALITY: ICD-10-CM

## 2019-10-31 DIAGNOSIS — M25.561 ACUTE PAIN OF RIGHT KNEE: Primary | ICD-10-CM

## 2019-10-31 DIAGNOSIS — Z47.1 AFTERCARE FOLLOWING KNEE JOINT REPLACEMENT SURGERY, UNSPECIFIED LATERALITY: ICD-10-CM

## 2019-10-31 PROCEDURE — 97110 THERAPEUTIC EXERCISES: CPT | Mod: GP | Performed by: PHYSICAL THERAPIST

## 2019-10-31 PROCEDURE — 97530 THERAPEUTIC ACTIVITIES: CPT | Mod: GP | Performed by: PHYSICAL THERAPIST

## 2019-11-06 ENCOUNTER — THERAPY VISIT (OUTPATIENT)
Dept: PHYSICAL THERAPY | Facility: CLINIC | Age: 64
End: 2019-11-06
Payer: COMMERCIAL

## 2019-11-06 DIAGNOSIS — M25.561 ACUTE PAIN OF RIGHT KNEE: ICD-10-CM

## 2019-11-06 DIAGNOSIS — Z47.1 AFTERCARE FOLLOWING KNEE JOINT REPLACEMENT SURGERY, UNSPECIFIED LATERALITY: ICD-10-CM

## 2019-11-06 DIAGNOSIS — Z96.659 AFTERCARE FOLLOWING KNEE JOINT REPLACEMENT SURGERY, UNSPECIFIED LATERALITY: ICD-10-CM

## 2019-11-06 PROCEDURE — 97530 THERAPEUTIC ACTIVITIES: CPT | Mod: GP | Performed by: PHYSICAL THERAPY ASSISTANT

## 2019-11-06 PROCEDURE — 97110 THERAPEUTIC EXERCISES: CPT | Mod: GP | Performed by: PHYSICAL THERAPY ASSISTANT

## 2019-11-11 ENCOUNTER — THERAPY VISIT (OUTPATIENT)
Dept: PHYSICAL THERAPY | Facility: CLINIC | Age: 64
End: 2019-11-11
Payer: COMMERCIAL

## 2019-11-11 DIAGNOSIS — Z96.659 AFTERCARE FOLLOWING KNEE JOINT REPLACEMENT SURGERY, UNSPECIFIED LATERALITY: ICD-10-CM

## 2019-11-11 DIAGNOSIS — M25.561 ACUTE PAIN OF RIGHT KNEE: ICD-10-CM

## 2019-11-11 DIAGNOSIS — Z47.1 AFTERCARE FOLLOWING KNEE JOINT REPLACEMENT SURGERY, UNSPECIFIED LATERALITY: ICD-10-CM

## 2019-11-11 PROCEDURE — 97530 THERAPEUTIC ACTIVITIES: CPT | Mod: GP | Performed by: PHYSICAL THERAPY ASSISTANT

## 2019-11-11 PROCEDURE — 97110 THERAPEUTIC EXERCISES: CPT | Mod: GP | Performed by: PHYSICAL THERAPY ASSISTANT

## 2019-11-18 ENCOUNTER — THERAPY VISIT (OUTPATIENT)
Dept: PHYSICAL THERAPY | Facility: CLINIC | Age: 64
End: 2019-11-18
Payer: COMMERCIAL

## 2019-11-18 DIAGNOSIS — M25.561 ACUTE PAIN OF RIGHT KNEE: ICD-10-CM

## 2019-11-18 DIAGNOSIS — Z96.659 AFTERCARE FOLLOWING KNEE JOINT REPLACEMENT SURGERY, UNSPECIFIED LATERALITY: ICD-10-CM

## 2019-11-18 DIAGNOSIS — Z47.1 AFTERCARE FOLLOWING KNEE JOINT REPLACEMENT SURGERY, UNSPECIFIED LATERALITY: ICD-10-CM

## 2019-11-18 PROCEDURE — 97110 THERAPEUTIC EXERCISES: CPT | Mod: GP | Performed by: PHYSICAL THERAPY ASSISTANT

## 2019-11-18 PROCEDURE — 97530 THERAPEUTIC ACTIVITIES: CPT | Mod: GP | Performed by: PHYSICAL THERAPY ASSISTANT

## 2019-11-18 NOTE — LETTER
[facility] ATHLETIC [facility] PHYSICAL [id number]   [place] [address]  [phone]    2019    Re: Iris Madsen   :   1955  MRN:  [id number]   REFERRING PHYSICIAN:   Cassandra Aldridge    [facility] ATHLETIC [facility] PHYSICAL THERAPY    Date of Initial Evaluation:  10/3/2019  Visits:    12  Reason for Referral:     Acute pain of right knee  Aftercare following knee joint replacement surgery, unspecified laterality    PROGRESS  REPORT:  Progress reporting period is from 10/3/2019 to 2019. .     SUBJECTIVE  Pt reports no pain in knee and is mainly slightly stiff. Is walking around home without her cane and using while in public  as needed. Leg feels stronger . Plans on returning home in about a month.     Current pain level is 0/10  .     Changes in function:  Yes (See Goal flowsheet attached for changes in current functional level)  Adverse reaction to treatment or activity: None    OBJECTIVE  Objective: Knee ROM 0-0-135. Gait NNL with intermittent cueing needed for push off . Able to negotiate stairs with minimal pelvic compensation now and with good eccentric control. Is doing well with her HEP and has been compliant. SLS time approx 8-10 seconds max before touching down.      ASSESSMENT/PLAN  Updated problem list and treatment plan: Diagnosis 1:  R TKR   STG/LTGs have been met or progress has been made towards goals:  Yes (See Goal flow sheet completed today.)  Assessment of Progress: Patient is meeting short term goals and is progressing towards long term goals.  Self Management Plans:  Patient has been instructed in a home treatment program.  Patient is independent in a home treatment program.  Patient  has been instructed in self management of symptoms.  Patient is independent in self management of symptoms.  I have re-evaluated this patient and find that the nature, scope, duration and intensity of the therapy is appropriate for the  medical condition of the patient.  Iris continues to require the following intervention to meet STG and LTG's:  PT          Re: Iris Madsen   :   1955    Recommendations:  This patient would benefit from continued therapy.     Frequency:  1 X week, once daily  Duration:  For 2 weeks tapering to 1x every 2 weeks for 2-3 weeks     Thank you for your referral.    INQUIRIES    Therapist:  Areli Lizarraga [facility]  [facility]IC MEDIC[facility] PHYSICAL THERAPY  [address] [place] [address] [address]  [facility] [address]  Phone: [phone]  Fax: [phone]

## 2019-11-18 NOTE — PROGRESS NOTES
Subjective:  HPI                    Objective:  System    Physical Exam    General     ROS    Assessment/Plan:    PROGRESS  REPORT    Progress reporting period is from 10/3/2019 to 11/18/2019. .       SUBJECTIVE    Pt reports no pain in knee and is mainly slightly stiff. Is walking around home without her cane and using while in public  as needed. Leg feels stronger . Plans on returning home in about a month.     Current pain level is 0/10  .         Changes in function:  Yes (See Goal flowsheet attached for changes in current functional level)  Adverse reaction to treatment or activity: None    OBJECTIVE    Objective: Knee ROM 0-0-135. Gait NNL with intermittent cueing needed for push off . Able to negotiate stairs with minimal pelvic compensation now and with good eccentric control. Is doing well with her HEP and has been compliant. SLS time approx 8-10 seconds max before touching down.      ASSESSMENT/PLAN  Updated problem list and treatment plan: Diagnosis 1:  R TKR   STG/LTGs have been met or progress has been made towards goals:  Yes (See Goal flow sheet completed today.)  Assessment of Progress: Patient is meeting short term goals and is progressing towards long term goals.  Self Management Plans:  Patient has been instructed in a home treatment program.  Patient is independent in a home treatment program.  Patient  has been instructed in self management of symptoms.  Patient is independent in self management of symptoms.  I have re-evaluated this patient and find that the nature, scope, duration and intensity of the therapy is appropriate for the medical condition of the patient.  [place] continues to require the following intervention to meet STG and LTG's:  PT    Recommendations:  This patient would benefit from continued therapy.     Frequency:  1 X week, once daily  Duration:  For 2 weeks tapering to 1x every 2 weeks for 2-3 weeks         Please refer to the daily flowsheet for treatment today, total  treatment time and time spent performing 1:1 timed codes.

## 2019-11-21 ENCOUNTER — THERAPY VISIT (OUTPATIENT)
Dept: PHYSICAL THERAPY | Facility: CLINIC | Age: 64
End: 2019-11-21
Payer: COMMERCIAL

## 2019-11-21 DIAGNOSIS — M25.561 ACUTE PAIN OF RIGHT KNEE: Primary | ICD-10-CM

## 2019-11-21 DIAGNOSIS — Z96.659 AFTERCARE FOLLOWING KNEE JOINT REPLACEMENT SURGERY, UNSPECIFIED LATERALITY: ICD-10-CM

## 2019-11-21 DIAGNOSIS — Z47.1 AFTERCARE FOLLOWING KNEE JOINT REPLACEMENT SURGERY, UNSPECIFIED LATERALITY: ICD-10-CM

## 2019-11-21 PROCEDURE — 97530 THERAPEUTIC ACTIVITIES: CPT | Mod: GP | Performed by: PHYSICAL THERAPIST

## 2019-11-21 PROCEDURE — 97110 THERAPEUTIC EXERCISES: CPT | Mod: GP | Performed by: PHYSICAL THERAPIST

## 2019-11-21 NOTE — LETTER
[facility] ATHLETIC [facility] PHYSICAL THER  [address]   [place] [address]  [phone]    2019    Re: Iris Madsen   :   1955  MRN:  [id number]   REFERRING PHYSICIAN:   Cassandra Aldridge    [facility] ATHLETIC [facility] [place] PHYSICAL THER    Date of Initial Evaluation:  10/03/2019  Visits:  Rxs Used: 13  Reason for Referral:     Acute pain of right knee  Aftercare following knee joint replacement surgery, unspecified laterality    EVALUATION SUMMARY    DISCHARGE REPORT  Progress reporting period is from 10.3.19 to 19.       SUBJECTIVE  Subjective changes noted by patient:  Pt saw MD yesterday and was pleased with progress.  Pt denies any pain in knee.  Is walking around house without cane and is able to go up/down stairs in reciprocal fashion with railing.  Has continued to be consistent with HEP.  Will go back home to [facility] after [place].       Changes in function:  Yes (See Goal flowsheet attached for changes in current functional level)  Adverse reaction to treatment or activity: None  OBJECTIVE  Objective: ROM:  Rt knee 0-135 deg.  Strength:  Quad 5/5; H-S 5/5.  ASSESSMENT/PLAN  Updated problem list and treatment plan:   Diagnosis 1:  Rt TKA    Decreased ROM/flexibility - therapeutic exercise and home program  Decreased joint mobility - therapeutic exercise and home program  Decreased strength - therapeutic exercise and home program  Impaired muscle performance - home program  STG/LTGs have been met or progress has been made towards goals:  Yes (See Goal flow sheet completed today.)  Assessment of Progress: The patient's condition is improving.  The patient has met all of their long term goals.  Self Management Plans:  Patient is independent in a home treatment program.  Patient is independent in self management of symptoms.  I have re-evaluated this patient and find that the nature, scope, duration and intensity of the therapy is appropriate  for the medical condition of the patient.  [place] continues to require the following intervention to meet STG and LTG's:  PT intervention is no longer required to meet STG/LTG.  Recommendations:  This patient is ready to be discharged from therapy and continue their home treatment program.  Thank you for your referral.    INQUIRIES  Therapist: Jesus Ceballos, PT   [facility]  [address] [facility] [address]   [place] [address]  Phone: [phone]  Fax: [phone]

## 2019-11-26 PROBLEM — Z96.659 AFTERCARE FOLLOWING KNEE JOINT REPLACEMENT SURGERY, UNSPECIFIED LATERALITY: Status: RESOLVED | Noted: 2019-10-03 | Resolved: 2019-11-26

## 2019-11-26 PROBLEM — Z47.1 AFTERCARE FOLLOWING KNEE JOINT REPLACEMENT SURGERY, UNSPECIFIED LATERALITY: Status: RESOLVED | Noted: 2019-10-03 | Resolved: 2019-11-26

## 2019-11-26 PROBLEM — M25.561 ACUTE PAIN OF RIGHT KNEE: Status: RESOLVED | Noted: 2019-10-03 | Resolved: 2019-11-26

## 2019-11-26 NOTE — PROGRESS NOTES
DISCHARGE REPORT    Progress reporting period is from 10.3.19 to 11.26.19.       SUBJECTIVE  Subjective changes noted by patient:  Pt saw MD yesterday and was pleased with progress.  Pt denies any pain in knee.  Is walking around house without cane and is able to go up/down stairs in reciprocal fashion with railing.  Has continued to be consistent with HEP.  Will go back home to [facility] after [place].       .      .   Changes in function:  Yes (See Goal flowsheet attached for changes in current functional level)  Adverse reaction to treatment or activity: None    OBJECTIVE  Objective: ROM:  Rt knee 0-135 deg.  Strength:  Quad 5/5; H-S 5/5.      ASSESSMENT/PLAN  Updated problem list and treatment plan:   Diagnosis 1:  Rt TKA    Decreased ROM/flexibility - therapeutic exercise and home program  Decreased joint mobility - therapeutic exercise and home program  Decreased strength - therapeutic exercise and home program  Impaired muscle performance - home program  STG/LTGs have been met or progress has been made towards goals:  Yes (See Goal flow sheet completed today.)  Assessment of Progress: The patient's condition is improving.  The patient has met all of their long term goals.  Self Management Plans:  Patient is independent in a home treatment program.  Patient is independent in self management of symptoms.  I have re-evaluated this patient and find that the nature, scope, duration and intensity of the therapy is appropriate for the medical condition of the patient.  Industry continues to require the following intervention to meet STG and LTG's:  PT intervention is no longer required to meet STG/LTG.    Recommendations:  This patient is ready to be discharged from therapy and continue their home treatment program.

## 2023-07-12 ENCOUNTER — TRANSCRIBE ORDERS (OUTPATIENT)
Dept: OTHER | Age: 68
End: 2023-07-12

## 2023-07-12 DIAGNOSIS — M54.50 RIGHT-SIDED LOW BACK PAIN WITHOUT SCIATICA, UNSPECIFIED CHRONICITY: Primary | ICD-10-CM
